# Patient Record
Sex: MALE | Race: WHITE | NOT HISPANIC OR LATINO
[De-identification: names, ages, dates, MRNs, and addresses within clinical notes are randomized per-mention and may not be internally consistent; named-entity substitution may affect disease eponyms.]

---

## 2024-07-29 ENCOUNTER — RESULT REVIEW (OUTPATIENT)
Age: 44
End: 2024-07-29

## 2024-07-30 ENCOUNTER — TRANSCRIPTION ENCOUNTER (OUTPATIENT)
Age: 44
End: 2024-07-30

## 2024-07-31 ENCOUNTER — TRANSCRIPTION ENCOUNTER (OUTPATIENT)
Age: 44
End: 2024-07-31

## 2024-08-09 PROBLEM — Z00.00 ENCOUNTER FOR PREVENTIVE HEALTH EXAMINATION: Status: ACTIVE | Noted: 2024-08-09

## 2024-08-12 ENCOUNTER — NON-APPOINTMENT (OUTPATIENT)
Age: 44
End: 2024-08-12

## 2024-08-13 ENCOUNTER — APPOINTMENT (OUTPATIENT)
Dept: SURGERY | Facility: CLINIC | Age: 44
End: 2024-08-13

## 2024-08-13 ENCOUNTER — NON-APPOINTMENT (OUTPATIENT)
Age: 44
End: 2024-08-13

## 2024-08-13 VITALS
HEART RATE: 89 BPM | WEIGHT: 240.2 LBS | OXYGEN SATURATION: 97 % | DIASTOLIC BLOOD PRESSURE: 72 MMHG | HEIGHT: 74 IN | TEMPERATURE: 98.1 F | BODY MASS INDEX: 30.83 KG/M2 | SYSTOLIC BLOOD PRESSURE: 108 MMHG

## 2024-08-13 PROCEDURE — 99024 POSTOP FOLLOW-UP VISIT: CPT

## 2024-08-13 NOTE — ASSESSMENT
[FreeTextEntry1] : Is a gentleman who underwent a laparoscopic cholecystectomy urgently at the end of July 2024.  He now presents to the office with complaint that there is a lump at his incision site and the lump is uncomfortable and causes him discomfort and pain when he pushes on it.  He has had no bouts of vomiting but he has been nauseous since surgery but over the past week that nausea has been slowly improving.  He has had no fever chills or sweats.  Physical examination patient is examined in supine position.  Them soft nontender nondistended.  He has well-healed incisions.  He has a suture in the right upper quadrant incision that suture was removed today.  He clearly has a soft tissue mass deep to the supraumbilical scar from the extraction site of the cholecystectomy.  The mass is approximately the size of a super ball.  The mass is mildly tender on deep palpation.  There is no other significant findings.  Impression/plan status post lap marianela chronically incarcerated incisional hernia: This is a 44-year-old gentleman who underwent a laparoscopic cholecystectomy approximately 2 weeks ago who now presents with an incarcerated hernia at the extraction site.  I suspect it is fat that is incarcerated given his overall GI complaints and findings on examination today.  The patient myself had a long conversation regarding the ability to go and repair this and when to repair this.  He seems to be asymptomatic therefore I will see him back at the end of September to reevaluate this problem and discuss timing of surgery once again.

## 2024-08-15 DIAGNOSIS — K43.0 INCISIONAL HERNIA WITH OBSTRUCTION, W/OUT GANGRENE: ICD-10-CM

## 2024-08-15 DIAGNOSIS — Z80.0 FAMILY HISTORY OF MALIGNANT NEOPLASM OF DIGESTIVE ORGANS: ICD-10-CM

## 2024-08-15 DIAGNOSIS — Z77.22 CONTACT WITH AND (SUSPECTED) EXPOSURE TO ENVIRONMENTAL TOBACCO SMOKE (ACUTE) (CHRONIC): ICD-10-CM

## 2024-08-15 DIAGNOSIS — K80.12 CALCULUS OF GALLBLADDER WITH ACUTE AND CHRONIC CHOLECYSTITIS W/OUT OBSTRUCTION: ICD-10-CM

## 2024-08-15 DIAGNOSIS — J45.909 UNSPECIFIED ASTHMA, UNCOMPLICATED: ICD-10-CM

## 2024-08-15 DIAGNOSIS — Z78.9 OTHER SPECIFIED HEALTH STATUS: ICD-10-CM

## 2024-08-19 ENCOUNTER — APPOINTMENT (OUTPATIENT)
Dept: SURGERY | Facility: CLINIC | Age: 44
End: 2024-08-19

## 2024-09-24 ENCOUNTER — APPOINTMENT (OUTPATIENT)
Dept: SURGERY | Facility: CLINIC | Age: 44
End: 2024-09-24

## 2024-09-24 VITALS
TEMPERATURE: 98.2 F | DIASTOLIC BLOOD PRESSURE: 85 MMHG | OXYGEN SATURATION: 97 % | SYSTOLIC BLOOD PRESSURE: 121 MMHG | HEART RATE: 75 BPM

## 2024-09-24 DIAGNOSIS — M62.08 SEPARATION OF MUSCLE (NONTRAUMATIC), OTHER SITE: ICD-10-CM

## 2024-09-24 PROCEDURE — 99213 OFFICE O/P EST LOW 20 MIN: CPT | Mod: 24

## 2024-09-24 NOTE — ASSESSMENT
[FreeTextEntry1] : Patient presents today for follow-up of his chronically incarcerated incisional hernia.  Patient states the pain is markedly improved but he still has significant discomfort at the level of the hernia.  He still wants his hernia repaired.  Patient examined erect and supine position.  He has a well-healed scar supraumbilically.  Deep to the scar is a soft tissue mass consistent with a chronically incarcerated incisional hernia.  This occurred after his lap marianela.  He is also noted to have a diastases from xiphoid to umbilicus.  Impression/plan diastases recti, chronically incarcerated incisional hernia: This is a 44-year-old gentleman who wants to have his incisional hernia repaired.  He and I had a long conversation regarding the for surgical approaches such as open robotic and laparoscopic patient will be scheduled for an open repair with a component separation as needed depending on the operative findings and the amount of frayed tissue at the time of surgery.  Given his diastases he is more likely develop a postop recurrence and because of this diastases patient is considering having an concomitant abdominoplasty.  He will see the plastic surgeon and therefore surgery will not be scheduled at this point till this plastic surgery consultation.  If he chooses to have both surgeries we will coordinate and do a concomitant operation.  If not we will move ahead and schedule him for just a hernia repair.  The indications alternatives and complications of the hernia repair and component separation has been discussed questions have been answered written consent will be obtained in the office today.

## 2024-09-25 PROBLEM — M62.08 DIASTASIS RECTI: Status: ACTIVE | Noted: 2024-09-25

## 2024-10-22 ENCOUNTER — APPOINTMENT (OUTPATIENT)
Dept: PLASTIC SURGERY | Facility: CLINIC | Age: 44
End: 2024-10-22

## 2024-10-22 VITALS
WEIGHT: 251 LBS | SYSTOLIC BLOOD PRESSURE: 124 MMHG | BODY MASS INDEX: 32.21 KG/M2 | HEART RATE: 65 BPM | OXYGEN SATURATION: 96 % | RESPIRATION RATE: 18 BRPM | TEMPERATURE: 98 F | DIASTOLIC BLOOD PRESSURE: 86 MMHG | HEIGHT: 74 IN

## 2024-10-22 PROCEDURE — 99499 UNLISTED E&M SERVICE: CPT | Mod: NC

## 2025-01-07 ENCOUNTER — APPOINTMENT (OUTPATIENT)
Dept: SURGERY | Facility: CLINIC | Age: 45
End: 2025-01-07
Payer: MEDICAID

## 2025-01-07 VITALS — OXYGEN SATURATION: 97 % | DIASTOLIC BLOOD PRESSURE: 85 MMHG | HEART RATE: 71 BPM | SYSTOLIC BLOOD PRESSURE: 116 MMHG

## 2025-01-07 VITALS — TEMPERATURE: 97.5 F

## 2025-01-07 PROCEDURE — 99214 OFFICE O/P EST MOD 30 MIN: CPT

## 2025-02-24 ENCOUNTER — TRANSCRIPTION ENCOUNTER (OUTPATIENT)
Age: 45
End: 2025-02-24

## 2025-02-24 ENCOUNTER — RESULT REVIEW (OUTPATIENT)
Age: 45
End: 2025-02-24

## 2025-02-24 ENCOUNTER — APPOINTMENT (OUTPATIENT)
Dept: SURGERY | Facility: HOSPITAL | Age: 45
End: 2025-02-24

## 2025-03-06 ENCOUNTER — APPOINTMENT (OUTPATIENT)
Dept: SURGERY | Facility: CLINIC | Age: 45
End: 2025-03-06
Payer: MEDICAID

## 2025-03-06 VITALS
TEMPERATURE: 98 F | OXYGEN SATURATION: 97 % | SYSTOLIC BLOOD PRESSURE: 130 MMHG | DIASTOLIC BLOOD PRESSURE: 89 MMHG | HEART RATE: 84 BPM

## 2025-03-06 PROCEDURE — 99024 POSTOP FOLLOW-UP VISIT: CPT
